# Patient Record
Sex: MALE
[De-identification: names, ages, dates, MRNs, and addresses within clinical notes are randomized per-mention and may not be internally consistent; named-entity substitution may affect disease eponyms.]

---

## 2020-09-08 ENCOUNTER — HOSPITAL ENCOUNTER (OUTPATIENT)
Dept: HOSPITAL 92 - BICRAD | Age: 55
Discharge: HOME | End: 2020-09-08
Payer: COMMERCIAL

## 2020-09-08 DIAGNOSIS — M16.11: ICD-10-CM

## 2020-09-08 DIAGNOSIS — Z02.71: Primary | ICD-10-CM

## 2020-09-08 NOTE — RAD
RIGHT HIP 2 VIEWS:

 

Date:  09/08/2020

 

HISTORY:  

Right hip pain. Disability. 

 

FINDINGS/IMPRESSION: 

There are mild degenerative changes. No fracture, dislocation, or bony destruction is seen. 

 

 

POS: OFF

## 2020-09-08 NOTE — RAD
XR Shoulder Rt 3 View STANDARD



History: Pain



Comparison: None.



Findings: No acute fracture or malalignment. Ribs are intact.



Small erosions of the greater tuberosity footprint. Low-grade enthesopathic change of the deltoid fro
m the acromion.



Impression: No acute osseous abnormality.



Reported By: Luciano Vo 

Electronically Signed:  9/8/2020 11:38 AM